# Patient Record
Sex: MALE | Race: WHITE | NOT HISPANIC OR LATINO | Employment: FULL TIME | URBAN - METROPOLITAN AREA
[De-identification: names, ages, dates, MRNs, and addresses within clinical notes are randomized per-mention and may not be internally consistent; named-entity substitution may affect disease eponyms.]

---

## 2020-02-04 ENCOUNTER — OFFICE VISIT (OUTPATIENT)
Dept: FAMILY MEDICINE CLINIC | Facility: CLINIC | Age: 30
End: 2020-02-04
Payer: COMMERCIAL

## 2020-02-04 VITALS
RESPIRATION RATE: 16 BRPM | TEMPERATURE: 98.6 F | SYSTOLIC BLOOD PRESSURE: 100 MMHG | WEIGHT: 187 LBS | OXYGEN SATURATION: 97 % | DIASTOLIC BLOOD PRESSURE: 60 MMHG | HEART RATE: 70 BPM | BODY MASS INDEX: 26.77 KG/M2 | HEIGHT: 70 IN

## 2020-02-04 DIAGNOSIS — Z13.83 SCREENING FOR CARDIOVASCULAR, RESPIRATORY, AND GENITOURINARY DISEASES: ICD-10-CM

## 2020-02-04 DIAGNOSIS — Z13.1 SCREENING FOR DIABETES MELLITUS (DM): ICD-10-CM

## 2020-02-04 DIAGNOSIS — Z13.6 SCREENING FOR CARDIOVASCULAR, RESPIRATORY, AND GENITOURINARY DISEASES: ICD-10-CM

## 2020-02-04 DIAGNOSIS — Z13.89 SCREENING FOR CARDIOVASCULAR, RESPIRATORY, AND GENITOURINARY DISEASES: ICD-10-CM

## 2020-02-04 DIAGNOSIS — Z00.00 HEALTHCARE MAINTENANCE: Primary | ICD-10-CM

## 2020-02-04 DIAGNOSIS — K64.8 INTERNAL HEMORRHOIDS: ICD-10-CM

## 2020-02-04 DIAGNOSIS — Z98.52 S/P VASECTOMY: ICD-10-CM

## 2020-02-04 PROCEDURE — 46600 DIAGNOSTIC ANOSCOPY SPX: CPT | Performed by: FAMILY MEDICINE

## 2020-02-04 PROCEDURE — 99385 PREV VISIT NEW AGE 18-39: CPT | Performed by: FAMILY MEDICINE

## 2020-02-04 RX ORDER — HYDROCORTISONE ACETATE 25 MG/1
25 SUPPOSITORY RECTAL 2 TIMES DAILY
Qty: 30 SUPPOSITORY | Refills: 5 | Status: SHIPPED | OUTPATIENT
Start: 2020-02-04 | End: 2020-09-17

## 2020-02-04 RX ORDER — IBUPROFEN 200 MG
600 TABLET ORAL EVERY 6 HOURS PRN
COMMUNITY
Start: 2018-10-03 | End: 2020-02-04

## 2020-02-05 NOTE — PROGRESS NOTES
Assessment/Plan:    No problem-specific Assessment & Plan notes found for this encounter  Anoscopy done, if bleeding continues despite anusol then GI referral advised    Had vasectomy    cpe done     Diagnoses and all orders for this visit:    Healthcare maintenance    Screening for cardiovascular, respiratory, and genitourinary diseases  -     Lipid Panel with Direct LDL reflex; Future    Screening for diabetes mellitus (DM)  -     Comprehensive metabolic panel; Future    Internal hemorrhoids  -     hydrocortisone (ANUSOL-HC) 25 mg suppository; Insert 1 suppository (25 mg total) into the rectum 2 (two) times a day  -     Lower Endoscopy    S/P vasectomy    Other orders  -     Discontinue: ibuprofen (MOTRIN) 200 mg tablet; Take 600 mg by mouth every 6 (six) hours as needed  -     Cancel: Lower Endoscopy          Return if symptoms worsen or fail to improve  Subjective:      Patient ID: Goran Hernandez is a 26 y o  male  Chief Complaint   Patient presents with    Physical Exam     jlopezcma        HPI  Eats healthy  Limit carbs and fatty food  High chol in past due to diet  No exercise  2 kids, 4 5y and 18m, one son with autism  Had adacel in past  Works in [employer]    Notes blood on tissue when wiping  No melena  For a while  Spends too much time toilet per wife    The following portions of the patient's history were reviewed and updated as appropriate: allergies, current medications, past family history, past medical history, past social history, past surgical history and problem list     Review of Systems   Respiratory: Negative for shortness of breath  Cardiovascular: Negative for chest pain  Gastrointestinal: Positive for blood in stool  Negative for rectal pain           Current Outpatient Medications   Medication Sig Dispense Refill    hydrocortisone (ANUSOL-HC) 25 mg suppository Insert 1 suppository (25 mg total) into the rectum 2 (two) times a day 30 suppository 5     No current facility-administered medications for this visit  Objective:    /60   Pulse 70   Temp 98 6 °F (37 °C)   Resp 16   Ht 5' 10" (1 778 m)   Wt 84 8 kg (187 lb)   SpO2 97%   BMI 26 83 kg/m²        Physical Exam   Constitutional: He appears well-developed  No distress  HENT:   Head: Normocephalic  Right Ear: External ear normal    Left Ear: External ear normal    Mouth/Throat: No oropharyngeal exudate  Eyes: Conjunctivae are normal  No scleral icterus  Neck: Neck supple  Carotid bruit is not present  No thyromegaly present  Cardiovascular: Normal rate, regular rhythm, normal heart sounds and intact distal pulses  No murmur heard  Pulmonary/Chest: Effort normal and breath sounds normal  No respiratory distress  He has no wheezes  Abdominal: Soft  Bowel sounds are normal  He exhibits no mass  There is no tenderness  There is no guarding  No hernia  Genitourinary: Rectum normal and penis normal    Genitourinary Comments: Internal hemorrhoids seen on anoscope   Musculoskeletal: He exhibits no edema or deformity  Lymphadenopathy:     He has no cervical adenopathy  Neurological: He is alert  He exhibits normal muscle tone  Skin: Skin is warm and dry  No pallor  Psychiatric: His behavior is normal  Thought content normal    Nursing note and vitals reviewed  Lower Endoscopy  Date/Time: 2/4/2020 6:45 PM  Performed by: Tatiana Goodpasture, DO  Authorized by: Tatiana Goodpasture, DO     Verbal consent obtained?: Yes    Risks and benefits: Risks, benefits and alternatives were discussed    Consent given by:  Patient  Patient states understanding of procedure being performed: Yes    Patient's understanding of procedure matches consent: Yes    Procedure consent matches procedure scheduled: Yes    Indications: rectal bleeding    Patient sedated: No    Preparation: Patient was prepped and draped in usual sterile fashion    Scope type:   Anoscope  External exam performed: Yes    Cecilio cyst: No    Pilonidal tenderness: No    Perianal skin tags: No    Perianal erythema: No    External hemorrhoids: No    Digital exam performed: No    Internal hemorrhoids: Yes    Internal hemorrhoid position:  Five o'clock, six o'clock and seven o'clock  Anal fissures: No    Anal fistulae: No    Anal stricture: No    Procedure termination:  Procedure complete  Patient tolerance:  Patient tolerated the procedure well with no immediate complications      BMI Counseling: Body mass index is 26 83 kg/m²  The BMI is above normal  Nutrition recommendations include moderation in carbohydrate intake  Exercise recommendations include exercising 3-5 times per week  No pharmacotherapy was ordered                Ayleen Lynch DO

## 2020-02-06 ENCOUNTER — TELEPHONE (OUTPATIENT)
Dept: FAMILY MEDICINE CLINIC | Facility: CLINIC | Age: 30
End: 2020-02-06

## 2020-02-06 NOTE — TELEPHONE ENCOUNTER
Patient called saying tonie was charging them over $50 for the ANUSOL   I sent them the coupon from [facility] for the 30ct via text which would be about $57   Sulma Wilson MA

## 2020-02-06 NOTE — TELEPHONE ENCOUNTER
He has INTERNAL hemorrhoids so creams not indicated  Does the pharmacy know of a steroid suppository on his formulary?

## 2020-02-06 NOTE — TELEPHONE ENCOUNTER
Please let him know that the suppositories appear to be $28 50 with the [employer] card at [place] for 12 suppositories  Or he can find an otc hemorrhoid product that is a suppository to use

## 2020-02-24 LAB
ALBUMIN SERPL-MCNC: 5 G/DL (ref 4–5)
ALBUMIN/GLOB SERPL: 2.6 {RATIO} (ref 1.2–2.2)
ALP SERPL-CCNC: 96 IU/L (ref 39–117)
ALT SERPL-CCNC: 18 IU/L (ref 0–44)
AST SERPL-CCNC: 16 IU/L (ref 0–40)
BILIRUB SERPL-MCNC: 0.5 MG/DL (ref 0–1.2)
BUN SERPL-MCNC: 16 MG/DL (ref 6–20)
BUN/CREAT SERPL: 15 (ref 9–20)
CALCIUM SERPL-MCNC: 9.7 MG/DL (ref 8.7–10.2)
CHLORIDE SERPL-SCNC: 100 MMOL/L (ref 96–106)
CHOLEST SERPL-MCNC: 203 MG/DL (ref 100–199)
CO2 SERPL-SCNC: 27 MMOL/L (ref 20–29)
CREAT SERPL-MCNC: 1.09 MG/DL (ref 0.76–1.27)
GLOBULIN SER-MCNC: 1.9 G/DL (ref 1.5–4.5)
GLUCOSE SERPL-MCNC: 100 MG/DL (ref 65–99)
HDLC SERPL-MCNC: 41 MG/DL
LDLC SERPL CALC-MCNC: 148 MG/DL (ref 0–99)
MICRODELETION SYND BLD/T FISH: NORMAL
POTASSIUM SERPL-SCNC: 4.5 MMOL/L (ref 3.5–5.2)
PROT SERPL-MCNC: 6.9 G/DL (ref 6–8.5)
SL AMB EGFR AFRICAN AMERICAN: 104 ML/MIN/1.73
SL AMB EGFR NON AFRICAN AMERICAN: 90 ML/MIN/1.73
SODIUM SERPL-SCNC: 140 MMOL/L (ref 134–144)
TRIGL SERPL-MCNC: 70 MG/DL (ref 0–149)

## 2020-02-28 PROBLEM — R73.01 IFG (IMPAIRED FASTING GLUCOSE): Status: ACTIVE | Noted: 2020-02-28

## 2020-08-13 ENCOUNTER — OFFICE VISIT (OUTPATIENT)
Dept: GASTROENTEROLOGY | Facility: CLINIC | Age: 30
End: 2020-08-13
Payer: COMMERCIAL

## 2020-08-13 ENCOUNTER — PREP FOR PROCEDURE (OUTPATIENT)
Dept: GASTROENTEROLOGY | Facility: CLINIC | Age: 30
End: 2020-08-13

## 2020-08-13 VITALS
HEART RATE: 82 BPM | DIASTOLIC BLOOD PRESSURE: 74 MMHG | WEIGHT: 189 LBS | TEMPERATURE: 98.6 F | HEIGHT: 70 IN | SYSTOLIC BLOOD PRESSURE: 122 MMHG | BODY MASS INDEX: 27.06 KG/M2

## 2020-08-13 DIAGNOSIS — K62.5 RECTAL BLEEDING: Primary | ICD-10-CM

## 2020-08-13 DIAGNOSIS — Z20.822 ENCOUNTER FOR LABORATORY TESTING FOR COVID-19 VIRUS: ICD-10-CM

## 2020-08-13 PROCEDURE — 3008F BODY MASS INDEX DOCD: CPT | Performed by: INTERNAL MEDICINE

## 2020-08-13 PROCEDURE — 99244 OFF/OP CNSLTJ NEW/EST MOD 40: CPT | Performed by: INTERNAL MEDICINE

## 2020-08-13 PROCEDURE — 1036F TOBACCO NON-USER: CPT | Performed by: INTERNAL MEDICINE

## 2020-08-13 NOTE — LETTER
August 13, 2020     Referral [address] [place] [id number]    Patient: Марина Mullins   YOB: 1990   Date of Visit: 8/13/2020       Dear Dr Elizondo Foot:    Thank you for referring Mague Martinez to me for evaluation  Below are my notes for this consultation  If you have questions, please do not hesitate to call me  I look forward to following your patient along with you  Sincerely,        Monica Ruiz MD        CC: Falguni Ribeiro, Petrina Lombard, MD  8/13/2020  3:23 PM  Sign when Signing Visit  Consultation - [facility]  Марина Mullins 31 y o  male MRN: [id number]  Unit/Bed#:  Encounter: [id number]        Consults    ASSESSMENT/PLAN:       1  Rectal bleeding-likely outlet (hemorrhoidal versus anal fissure) versus colon polyps or ulcerative proctitis  -would recommend increasing fiber to 30 grams daily  Add Metamucil 1 tablespoon on daily basis  -increase water intake to 64 ounces daily   -check CBC  -will plan colonoscopy to assess for colon polyps or lesions and to assess for inflammatory bowel disease   - Patient was explained about  the risks and benefits of the procedure  Risks including but not limited to bleeding, infection, perforation were explained in detail  Also explained about less than 100% sensitivity with the exam and other alternatives  ______________________________________________________________________    Reason for Consult / Principal Problem: [unfilled]    HPI: Марина Mullins is a 31y o  year old male with history of impaired fasting glucose presents for evaluation of rectal bleeding  Patient reports that he has been having rectal bleeding on and off for 1 year  He reports that it typically follows bowel movement  He reports that his bowel movements are fairly regular and can sometimes be soft  He denies any nocturnal symptoms, denies any mucus in the stool  He denies any abdominal pain    He denies any family history of colon cancer, no family history of colon polyps, inflammatory bowel disease or celiac disease  He reports that he was prescribed Anusol by his PCP without any symptomatic improvement despite completing the course  He denies any upper GI symptoms including acid reflux, dysphagia, odynophagia, loss of appetite or unintentional weight loss  Nausea and vomiting  No recent CBC  LFTs were normal   Renal function was normal with creatinine of 1 09  Review of Systems: The remainder of the review of systems was negative except for the pertinent positives noted in HPI  Historical Information   History reviewed  No pertinent past medical history  History reviewed  No pertinent surgical history  Social History   Social History     Substance and Sexual Activity   Alcohol Use Not Currently     Social History     Substance and Sexual Activity   Drug Use Never     Social History     Tobacco Use   Smoking Status Never Smoker   Smokeless Tobacco Never Used     Family History   Problem Relation Age of Onset    Depression Mother     Hypertension Father     Hyperlipidemia Father        Meds/Allergies     (Not in a hospital admission)    No current facility-administered medications for this visit  No Known Allergies    Objective     Blood pressure 122/74, pulse 82, temperature 98 6 °F (37 °C), height 5' 10" (1 778 m), weight 85 7 kg (189 lb)  [unfilled]    PHYSICAL EXAM     GEN: well nourished, well developed, no acute distress  HEENT: anicteric, MMM, no cervical or supraclavicular lymphadenopathy  CV: RRR, no m/r/g  CHEST: CTA b/l, no WRR  ABD: +BS, soft, NT/ND, no hepatosplenomegaly  EXT: no c/c/e  SKIN: no rashes,  NEURO: aaox3    Lab Results:   No visits with results within 1 Day(s) from this visit     Latest known visit with results is:   Orders Only on 02/22/2020   Component Date Value    Glucose, Random 02/22/2020 100*    BUN 02/22/2020 16     Creatinine 02/22/2020 1 09     eGFR Non  02/22/2020 90     eGFR  02/22/2020 104     SL AMB BUN/CREATININE RA* 02/22/2020 15     Sodium 02/22/2020 140     Potassium 02/22/2020 4 5     Chloride 02/22/2020 100     CO2 02/22/2020 27     CALCIUM 02/22/2020 9 7     Protein, Total 02/22/2020 6 9     Albumin 02/22/2020 5 0     Globulin, Total 02/22/2020 1 9     Albumin/Globulin Ratio 02/22/2020 2 6*    TOTAL BILIRUBIN 02/22/2020 0 5     Alk Phos Isoenzymes 02/22/2020 96     AST 02/22/2020 16     ALT 02/22/2020 18     Cholesterol, Total 02/22/2020 203*    Triglycerides 02/22/2020 70     HDL 02/22/2020 41     LDL Calculated 02/22/2020 148*    Interpretation 02/22/2020 Note      Imaging Studies: I have personally reviewed pertinent films in PACS

## 2020-08-13 NOTE — PROGRESS NOTES
Consultation - [facility]  Catrina Rashid 31 y o  male MRN: [id number]  Unit/Bed#:  Encounter: [id number]        Consults    ASSESSMENT/PLAN:       1  Rectal bleeding-likely outlet (hemorrhoidal versus anal fissure) versus colon polyps or ulcerative proctitis  -would recommend increasing fiber to 30 grams daily  Add Metamucil 1 tablespoon on daily basis  -increase water intake to 64 ounces daily   -check CBC  -will plan colonoscopy to assess for colon polyps or lesions and to assess for inflammatory bowel disease   - Patient was explained about  the risks and benefits of the procedure  Risks including but not limited to bleeding, infection, perforation were explained in detail  Also explained about less than 100% sensitivity with the exam and other alternatives  ______________________________________________________________________    Reason for Consult / Principal Problem: [unfilled]    HPI: Catrina Rashid is a 31y o  year old male with history of impaired fasting glucose presents for evaluation of rectal bleeding  Patient reports that he has been having rectal bleeding on and off for 1 year  He reports that it typically follows bowel movement  He reports that his bowel movements are fairly regular and can sometimes be soft  He denies any nocturnal symptoms, denies any mucus in the stool  He denies any abdominal pain  He denies any family history of colon cancer, no family history of colon polyps, inflammatory bowel disease or celiac disease  He reports that he was prescribed Anusol by his PCP without any symptomatic improvement despite completing the course  He denies any upper GI symptoms including acid reflux, dysphagia, odynophagia, loss of appetite or unintentional weight loss  Nausea and vomiting  No recent CBC  LFTs were normal   Renal function was normal with creatinine of 1 09  Review of Systems:   The remainder of the review of systems was negative except for the pertinent positives noted in HPI  Historical Information   History reviewed  No pertinent past medical history  History reviewed  No pertinent surgical history  Social History   Social History     Substance and Sexual Activity   Alcohol Use Not Currently     Social History     Substance and Sexual Activity   Drug Use Never     Social History     Tobacco Use   Smoking Status Never Smoker   Smokeless Tobacco Never Used     Family History   Problem Relation Age of Onset    Depression Mother     Hypertension Father     Hyperlipidemia Father        Meds/Allergies     (Not in a hospital admission)    No current facility-administered medications for this visit  No Known Allergies    Objective     Blood pressure 122/74, pulse 82, temperature 98 6 °F (37 °C), height 5' 10" (1 778 m), weight 85 7 kg (189 lb)  [unfilled]    PHYSICAL EXAM     GEN: well nourished, well developed, no acute distress  HEENT: anicteric, MMM, no cervical or supraclavicular lymphadenopathy  CV: RRR, no m/r/g  CHEST: CTA b/l, no WRR  ABD: +BS, soft, NT/ND, no hepatosplenomegaly  EXT: no c/c/e  SKIN: no rashes,  NEURO: aaox3    Lab Results:   No visits with results within 1 Day(s) from this visit     Latest known visit with results is:   Orders Only on 02/22/2020   Component Date Value    Glucose, Random 02/22/2020 100*    BUN 02/22/2020 16     Creatinine 02/22/2020 1 09     eGFR Non African American 02/22/2020 90     eGFR  02/22/2020 104     SL AMB BUN/CREATININE RA* 02/22/2020 15     Sodium 02/22/2020 140     Potassium 02/22/2020 4 5     Chloride 02/22/2020 100     CO2 02/22/2020 27     CALCIUM 02/22/2020 9 7     Protein, Total 02/22/2020 6 9     Albumin 02/22/2020 5 0     Globulin, Total 02/22/2020 1 9     Albumin/Globulin Ratio 02/22/2020 2 6*    TOTAL BILIRUBIN 02/22/2020 0 5     Alk Phos Isoenzymes 02/22/2020 96     AST 02/22/2020 16     ALT 02/22/2020 18     Cholesterol, Total 02/22/2020 203*    Triglycerides 02/22/2020 70     HDL 02/22/2020 41     LDL Calculated 02/22/2020 148*    Interpretation 02/22/2020 Note      Imaging Studies: I have personally reviewed pertinent films in PACS

## 2020-09-15 ENCOUNTER — TRANSCRIBE ORDERS (OUTPATIENT)
Dept: URGENT CARE | Facility: CLINIC | Age: 30
End: 2020-09-15

## 2020-09-15 DIAGNOSIS — Z11.59 SCREENING FOR VIRAL DISEASE: Primary | ICD-10-CM

## 2020-09-15 PROCEDURE — U0003 INFECTIOUS AGENT DETECTION BY NUCLEIC ACID (DNA OR RNA); SEVERE ACUTE RESPIRATORY SYNDROME CORONAVIRUS 2 (SARS-COV-2) (CORONAVIRUS DISEASE [COVID-19]), AMPLIFIED PROBE TECHNIQUE, MAKING USE OF HIGH THROUGHPUT TECHNOLOGIES AS DESCRIBED BY CMS-2020-01-R: HCPCS | Performed by: INTERNAL MEDICINE

## 2020-09-17 LAB — SARS-COV-2 RNA SPEC QL NAA+PROBE: NOT DETECTED

## 2020-09-17 NOTE — PRE-PROCEDURE INSTRUCTIONS
Pre-Surgery Instructions:   Medication Instructions    Aspirin-Acetaminophen-Caffeine (EXCEDRIN PO) Patient was instructed by Physician and understands   Calcium Polycarbophil (FIBER-CAPS PO) Patient was instructed by Physician and understands   Na Sulfate-K Sulfate-Mg Sulf 17 5-3 13-1 6 GM/177ML SOLN Patient was instructed by Physician and understands  Pt to follow Dr Thorne's instructions    wife Rula [facility] [phone]

## 2020-09-20 LAB
BASOPHILS # BLD AUTO: 0.1 X10E3/UL (ref 0–0.2)
BASOPHILS NFR BLD AUTO: 1 %
EOSINOPHIL # BLD AUTO: 0.2 X10E3/UL (ref 0–0.4)
EOSINOPHIL NFR BLD AUTO: 3 %
ERYTHROCYTE [DISTWIDTH] IN BLOOD BY AUTOMATED COUNT: 11.9 % (ref 11.6–15.4)
HCT VFR BLD AUTO: 47.1 % (ref 37.5–51)
HGB BLD-MCNC: 16.1 G/DL (ref 13–17.7)
IMM GRANULOCYTES # BLD: 0 X10E3/UL (ref 0–0.1)
IMM GRANULOCYTES NFR BLD: 0 %
LYMPHOCYTES # BLD AUTO: 2.9 X10E3/UL (ref 0.7–3.1)
LYMPHOCYTES NFR BLD AUTO: 47 %
MCH RBC QN AUTO: 28.4 PG (ref 26.6–33)
MCHC RBC AUTO-ENTMCNC: 34.2 G/DL (ref 31.5–35.7)
MCV RBC AUTO: 83 FL (ref 79–97)
MONOCYTES # BLD AUTO: 0.6 X10E3/UL (ref 0.1–0.9)
MONOCYTES NFR BLD AUTO: 9 %
NEUTROPHILS # BLD AUTO: 2.5 X10E3/UL (ref 1.4–7)
NEUTROPHILS NFR BLD AUTO: 40 %
PLATELET # BLD AUTO: 243 X10E3/UL (ref 150–450)
RBC # BLD AUTO: 5.66 X10E6/UL (ref 4.14–5.8)
WBC # BLD AUTO: 6.2 X10E3/UL (ref 3.4–10.8)

## 2020-09-21 ENCOUNTER — ANESTHESIA EVENT (OUTPATIENT)
Dept: GASTROENTEROLOGY | Facility: AMBULARY SURGERY CENTER | Age: 30
End: 2020-09-21

## 2020-09-21 ENCOUNTER — HOSPITAL ENCOUNTER (OUTPATIENT)
Dept: GASTROENTEROLOGY | Facility: AMBULARY SURGERY CENTER | Age: 30
Setting detail: OUTPATIENT SURGERY
Discharge: HOME | End: 2020-09-21
Attending: INTERNAL MEDICINE
Payer: COMMERCIAL

## 2020-09-21 ENCOUNTER — ANESTHESIA (OUTPATIENT)
Dept: GASTROENTEROLOGY | Facility: AMBULARY SURGERY CENTER | Age: 30
End: 2020-09-21

## 2020-09-21 VITALS — HEART RATE: 106 BPM

## 2020-09-21 VITALS
BODY MASS INDEX: 27.2 KG/M2 | OXYGEN SATURATION: 99 % | HEIGHT: 70 IN | RESPIRATION RATE: 18 BRPM | HEART RATE: 61 BPM | WEIGHT: 190 LBS | DIASTOLIC BLOOD PRESSURE: 80 MMHG | TEMPERATURE: 97 F | SYSTOLIC BLOOD PRESSURE: 122 MMHG

## 2020-09-21 DIAGNOSIS — K64.8 INTERNAL HEMORRHOIDS: Primary | ICD-10-CM

## 2020-09-21 DIAGNOSIS — K62.5 RECTAL BLEEDING: ICD-10-CM

## 2020-09-21 PROBLEM — K57.30 DIVERTICULOSIS OF COLON: Status: ACTIVE | Noted: 2020-09-21

## 2020-09-21 PROCEDURE — 45378 DIAGNOSTIC COLONOSCOPY: CPT | Performed by: INTERNAL MEDICINE

## 2020-09-21 RX ORDER — PROPOFOL 10 MG/ML
INJECTION, EMULSION INTRAVENOUS AS NEEDED
Status: DISCONTINUED | OUTPATIENT
Start: 2020-09-21 | End: 2020-09-21

## 2020-09-21 RX ORDER — SODIUM CHLORIDE, SODIUM LACTATE, POTASSIUM CHLORIDE, CALCIUM CHLORIDE 600; 310; 30; 20 MG/100ML; MG/100ML; MG/100ML; MG/100ML
125 INJECTION, SOLUTION INTRAVENOUS CONTINUOUS
Status: DISCONTINUED | OUTPATIENT
Start: 2020-09-21 | End: 2020-09-25 | Stop reason: HOSPADM

## 2020-09-21 RX ORDER — HYDROCORTISONE ACETATE 25 MG/1
25 SUPPOSITORY RECTAL 2 TIMES DAILY
Qty: 28 SUPPOSITORY | Refills: 0 | Status: SHIPPED | OUTPATIENT
Start: 2020-09-21

## 2020-09-21 RX ADMIN — SODIUM CHLORIDE, SODIUM LACTATE, POTASSIUM CHLORIDE, AND CALCIUM CHLORIDE 125 ML/HR: .6; .31; .03; .02 INJECTION, SOLUTION INTRAVENOUS at 11:17

## 2020-09-21 RX ADMIN — PROPOFOL 100 MG: 10 INJECTION, EMULSION INTRAVENOUS at 12:21

## 2020-09-21 RX ADMIN — PROPOFOL 20 MG: 10 INJECTION, EMULSION INTRAVENOUS at 12:25

## 2020-09-21 RX ADMIN — PROPOFOL 20 MG: 10 INJECTION, EMULSION INTRAVENOUS at 12:29

## 2020-09-21 RX ADMIN — PROPOFOL 20 MG: 10 INJECTION, EMULSION INTRAVENOUS at 12:27

## 2020-09-21 RX ADMIN — PROPOFOL 20 MG: 10 INJECTION, EMULSION INTRAVENOUS at 12:23

## 2020-09-21 RX ADMIN — PROPOFOL 20 MG: 10 INJECTION, EMULSION INTRAVENOUS at 12:32

## 2020-09-21 NOTE — ANESTHESIA POSTPROCEDURE EVALUATION
Post-Op Assessment Note    CV Status:  Stable    Pain management: adequate     Mental Status:  Alert and awake   Hydration Status:  Euvolemic   PONV Controlled:  Controlled   Airway Patency:  Patent      Post Op Vitals Reviewed: Yes      Staff: CRNA         No complications documented      BP   124/78   Temp  98 5   Pulse  70   Resp   18   SpO2   99

## 2020-09-21 NOTE — H&P
History and Physical -  [facility]  Ghanshyam Caro 31 y o  male MRN: [id number]    HPI: Ghanshyam Caro is a 31y o  year old male who presents for evaluation of rectal bleeding  Review of Systems    Historical Information   Past Medical History:   Diagnosis Date    Rectal bleeding      Past Surgical History:   Procedure Laterality Date    VASECTOMY      WISDOM TOOTH EXTRACTION       Social History   Social History     Substance and Sexual Activity   Alcohol Use Not Currently     Social History     Substance and Sexual Activity   Drug Use Never     Social History     Tobacco Use   Smoking Status Never Smoker   Smokeless Tobacco Former User    Types: [de-identified]     Family History   Problem Relation Age of Onset    Depression Mother     Hypertension Father     Hyperlipidemia Father        Meds/Allergies     (Not in a hospital admission)      No Known Allergies    Objective     /79   Pulse 77   Temp (!) 97 °F (36 1 °C) (Temporal)   Resp 16   Ht 5' 10" (1 778 m)   Wt 86 2 kg (190 lb)   SpO2 98%   BMI 27 26 kg/m²       PHYSICAL EXAM    Gen: NAD  CV: RRR  CHEST: Clear  ABD: soft, NT/ND  EXT: no edema  Neuro: AAO      ASSESSMENT/PLAN:  This is a 31y o  year old male here for evaluation rectal bleeding  PLAN:   Procedure:  Colonoscopy

## 2020-09-21 NOTE — ANESTHESIA PREPROCEDURE EVALUATION
Procedure:  COLONOSCOPY    Relevant Problems   GI/HEPATIC   (+) Rectal bleeding        Physical Exam    Airway    Mallampati score: II  TM Distance: >3 FB  Neck ROM: full     Dental   No notable dental hx     Cardiovascular  Rhythm: regular, Rate: normal, Cardiovascular exam normal    Pulmonary  Pulmonary exam normal Breath sounds clear to auscultation,     Other Findings        Anesthesia Plan  ASA Score- 2     Anesthesia Type- IV sedation with anesthesia with ASA Monitors  Additional Monitors:   Airway Plan:           Plan Factors-Exercise tolerance (METS): >4 METS  Chart reviewed  Patient summary reviewed  Patient is not a current smoker  Induction- intravenous  Postoperative Plan- Plan for postoperative opioid use  Informed Consent- Anesthetic plan and risks discussed with patient  I personally reviewed this patient with the CRNA  Discussed and agreed on the Anesthesia Plan with the CRNA  Lisa Lara

## 2020-10-03 ENCOUNTER — TELEPHONE (OUTPATIENT)
Dept: GASTROENTEROLOGY | Facility: CLINIC | Age: 30
End: 2020-10-03

## 2021-10-24 PROBLEM — R73.03 PREDIABETES: Status: ACTIVE | Noted: 2020-02-28

## 2021-10-25 ENCOUNTER — OFFICE VISIT (OUTPATIENT)
Dept: FAMILY MEDICINE CLINIC | Facility: CLINIC | Age: 31
End: 2021-10-25
Payer: COMMERCIAL

## 2021-10-25 VITALS
RESPIRATION RATE: 18 BRPM | DIASTOLIC BLOOD PRESSURE: 72 MMHG | HEART RATE: 85 BPM | BODY MASS INDEX: 29.89 KG/M2 | WEIGHT: 208.8 LBS | TEMPERATURE: 98.8 F | HEIGHT: 70 IN | SYSTOLIC BLOOD PRESSURE: 122 MMHG

## 2021-10-25 DIAGNOSIS — R73.03 PREDIABETES: ICD-10-CM

## 2021-10-25 DIAGNOSIS — Z23 IMMUNIZATION DUE: ICD-10-CM

## 2021-10-25 DIAGNOSIS — R53.83 OTHER FATIGUE: ICD-10-CM

## 2021-10-25 DIAGNOSIS — K57.30 DIVERTICULOSIS OF COLON: ICD-10-CM

## 2021-10-25 DIAGNOSIS — K64.8 INTERNAL HEMORRHOIDS: ICD-10-CM

## 2021-10-25 DIAGNOSIS — Z13.1 SCREENING FOR DIABETES MELLITUS (DM): ICD-10-CM

## 2021-10-25 DIAGNOSIS — Z13.6 SCREENING FOR CARDIOVASCULAR, RESPIRATORY, AND GENITOURINARY DISEASES: ICD-10-CM

## 2021-10-25 DIAGNOSIS — Z13.83 SCREENING FOR CARDIOVASCULAR, RESPIRATORY, AND GENITOURINARY DISEASES: ICD-10-CM

## 2021-10-25 DIAGNOSIS — Z00.00 HEALTHCARE MAINTENANCE: Primary | ICD-10-CM

## 2021-10-25 DIAGNOSIS — Z13.89 SCREENING FOR CARDIOVASCULAR, RESPIRATORY, AND GENITOURINARY DISEASES: ICD-10-CM

## 2021-10-25 PROBLEM — K62.5 RECTAL BLEEDING: Status: RESOLVED | Noted: 2020-08-13 | Resolved: 2021-10-25

## 2021-10-25 PROCEDURE — 3008F BODY MASS INDEX DOCD: CPT | Performed by: FAMILY MEDICINE

## 2021-10-25 PROCEDURE — 1036F TOBACCO NON-USER: CPT | Performed by: FAMILY MEDICINE

## 2021-10-25 PROCEDURE — 99395 PREV VISIT EST AGE 18-39: CPT | Performed by: FAMILY MEDICINE

## 2021-10-25 PROCEDURE — 3725F SCREEN DEPRESSION PERFORMED: CPT | Performed by: FAMILY MEDICINE

## 2021-10-25 PROCEDURE — 90471 IMMUNIZATION ADMIN: CPT

## 2021-10-25 PROCEDURE — 90686 IIV4 VACC NO PRSV 0.5 ML IM: CPT

## 2024-02-21 PROBLEM — Z13.89 SCREENING FOR CARDIOVASCULAR, RESPIRATORY, AND GENITOURINARY DISEASES: Status: RESOLVED | Noted: 2020-02-04 | Resolved: 2024-02-21

## 2024-02-21 PROBLEM — Z13.6 SCREENING FOR CARDIOVASCULAR, RESPIRATORY, AND GENITOURINARY DISEASES: Status: RESOLVED | Noted: 2020-02-04 | Resolved: 2024-02-21

## 2024-02-21 PROBLEM — Z13.1 SCREENING FOR DIABETES MELLITUS (DM): Status: RESOLVED | Noted: 2020-02-04 | Resolved: 2024-02-21

## 2024-02-21 PROBLEM — Z00.00 HEALTHCARE MAINTENANCE: Status: RESOLVED | Noted: 2020-02-04 | Resolved: 2024-02-21

## 2024-02-21 PROBLEM — Z13.83 SCREENING FOR CARDIOVASCULAR, RESPIRATORY, AND GENITOURINARY DISEASES: Status: RESOLVED | Noted: 2020-02-04 | Resolved: 2024-02-21

## 2024-03-08 ENCOUNTER — OFFICE VISIT (OUTPATIENT)
Dept: FAMILY MEDICINE CLINIC | Facility: CLINIC | Age: 34
End: 2024-03-08
Payer: COMMERCIAL

## 2024-03-08 VITALS
HEIGHT: 70 IN | RESPIRATION RATE: 18 BRPM | TEMPERATURE: 97 F | BODY MASS INDEX: 26.77 KG/M2 | DIASTOLIC BLOOD PRESSURE: 80 MMHG | HEART RATE: 80 BPM | SYSTOLIC BLOOD PRESSURE: 122 MMHG | WEIGHT: 187 LBS

## 2024-03-08 DIAGNOSIS — R73.03 PREDIABETES: ICD-10-CM

## 2024-03-08 DIAGNOSIS — L73.9 FOLLICULITIS: ICD-10-CM

## 2024-03-08 DIAGNOSIS — Z13.1 SCREENING FOR DIABETES MELLITUS (DM): ICD-10-CM

## 2024-03-08 DIAGNOSIS — Z13.220 SCREENING FOR LIPID DISORDERS: ICD-10-CM

## 2024-03-08 DIAGNOSIS — Z13.89 SCREENING FOR CARDIOVASCULAR, RESPIRATORY, AND GENITOURINARY DISEASES: ICD-10-CM

## 2024-03-08 DIAGNOSIS — Z00.00 HEALTHCARE MAINTENANCE: Primary | ICD-10-CM

## 2024-03-08 DIAGNOSIS — Z13.6 SCREENING FOR CARDIOVASCULAR, RESPIRATORY, AND GENITOURINARY DISEASES: ICD-10-CM

## 2024-03-08 DIAGNOSIS — Z13.83 SCREENING FOR CARDIOVASCULAR, RESPIRATORY, AND GENITOURINARY DISEASES: ICD-10-CM

## 2024-03-08 PROCEDURE — 99395 PREV VISIT EST AGE 18-39: CPT | Performed by: FAMILY MEDICINE

## 2024-03-08 RX ORDER — CHLORHEXIDINE GLUCONATE 4 G/100ML
1 SOLUTION TOPICAL DAILY PRN
Qty: 946 ML | Refills: 5 | Status: SHIPPED | OUTPATIENT
Start: 2024-03-08

## 2024-03-08 NOTE — PROGRESS NOTES
Assessment/Plan:    No problem-specific Assessment & Plan notes found for this encounter.    Cpe    Nonspecific folliculitis trunk with pustules  Instructed on hibiclens use daily for 1w then prn if helps    Fbs 100 in the past    Labs ordered and suggested yearly     Diagnoses and all orders for this visit:    Healthcare maintenance    Prediabetes  -     Hemoglobin A1C; Future    Folliculitis  -     chlorhexidine (HIBICLENS) 4 % external liquid; Apply 1 Application topically daily as needed for wound care    Screening for cardiovascular, respiratory, and genitourinary diseases    Screening for diabetes mellitus (DM)  -     Comprehensive metabolic panel; Future    Screening for lipid disorders  -     Lipid Panel with Direct LDL reflex; Future        Return if symptoms worsen or fail to improve.    Subjective:      Patient ID: Jose David Rogers is a 34 y.o. male.    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    Review of Systems   Constitutional:  Negative for chills, fever and unexpected weight change.         Current Outpatient Medications   Medication Sig Dispense Refill    Aspirin-Acetaminophen-Caffeine (EXCEDRIN PO) Take by mouth as needed      chlorhexidine (HIBICLENS) 4 % external liquid Apply 1 Application topically daily as needed for wound care 946 mL 5    hydrocortisone (ANUSOL-HC) 25 mg suppository Insert 1 suppository (25 mg total) into the rectum 2 (two) times a day 28 suppository 0     No current facility-administered medications for this visit.       Objective:    /80   Pulse 80   Temp (!) 97 °F (36.1  "°C)   Resp 18   Ht 5' 9.5\" (1.765 m)   Wt 84.8 kg (187 lb)   BMI 27.22 kg/m²        Physical Exam  Vitals and nursing note reviewed.   Constitutional:       General: He is not in acute distress.     Appearance: He is well-developed. He is not ill-appearing.   HENT:      Head: Normocephalic.      Right Ear: Tympanic membrane normal. There is no impacted cerumen.      Left Ear: Tympanic membrane normal. There is no impacted cerumen.      Nose: No congestion.      Mouth/Throat:      Pharynx: No oropharyngeal exudate.   Eyes:      General: No scleral icterus.     Conjunctiva/sclera: Conjunctivae normal.   Neck:      Vascular: No carotid bruit.   Cardiovascular:      Rate and Rhythm: Normal rate and regular rhythm.      Heart sounds: No murmur heard.  Pulmonary:      Effort: Pulmonary effort is normal. No respiratory distress.      Breath sounds: No wheezing.   Abdominal:      General: There is no distension.      Palpations: Abdomen is soft.      Tenderness: There is no abdominal tenderness.      Hernia: No hernia is present.   Genitourinary:     Penis: Normal.       Testes: Normal.   Musculoskeletal:         General: No deformity.      Cervical back: Neck supple.      Right lower leg: No edema.   Skin:     General: Skin is warm and dry.      Coloration: Skin is not jaundiced or pale.      Findings: Rash present.      Comments: Scattered rash sarabjit anterior trunk papulopustular  None on arms  No hot tub use   Neurological:      Mental Status: He is alert.      Motor: No weakness.      Gait: Gait normal.   Psychiatric:         Mood and Affect: Mood normal.         Behavior: Behavior normal.         Thought Content: Thought content normal.                Jesus Galvez DO    "

## 2024-03-10 LAB
ALBUMIN SERPL-MCNC: 4.9 G/DL (ref 4.1–5.1)
ALBUMIN/GLOB SERPL: 2.3 {RATIO} (ref 1.2–2.2)
ALP SERPL-CCNC: 87 IU/L (ref 44–121)
ALT SERPL-CCNC: 18 IU/L (ref 0–44)
AST SERPL-CCNC: 19 IU/L (ref 0–40)
BILIRUB SERPL-MCNC: 0.5 MG/DL (ref 0–1.2)
BUN SERPL-MCNC: 17 MG/DL (ref 6–20)
BUN/CREAT SERPL: 15 (ref 9–20)
CALCIUM SERPL-MCNC: 9.9 MG/DL (ref 8.7–10.2)
CHLORIDE SERPL-SCNC: 101 MMOL/L (ref 96–106)
CHOLEST SERPL-MCNC: 240 MG/DL (ref 100–199)
CO2 SERPL-SCNC: 23 MMOL/L (ref 20–29)
CREAT SERPL-MCNC: 1.12 MG/DL (ref 0.76–1.27)
EGFR: 88 ML/MIN/1.73
GLOBULIN SER-MCNC: 2.1 G/DL (ref 1.5–4.5)
GLUCOSE SERPL-MCNC: 110 MG/DL (ref 70–99)
HBA1C MFR BLD: 5.8 % (ref 4.8–5.6)
HDLC SERPL-MCNC: 45 MG/DL
LDLC SERPL CALC-MCNC: 180 MG/DL (ref 0–99)
MICRODELETION SYND BLD/T FISH: NORMAL
POTASSIUM SERPL-SCNC: 4.7 MMOL/L (ref 3.5–5.2)
PROT SERPL-MCNC: 7 G/DL (ref 6–8.5)
SODIUM SERPL-SCNC: 139 MMOL/L (ref 134–144)
TRIGL SERPL-MCNC: 85 MG/DL (ref 0–149)